# Patient Record
Sex: FEMALE | Race: WHITE | Employment: UNEMPLOYED | URBAN - METROPOLITAN AREA
[De-identification: names, ages, dates, MRNs, and addresses within clinical notes are randomized per-mention and may not be internally consistent; named-entity substitution may affect disease eponyms.]

---

## 2017-03-09 ENCOUNTER — OFFICE VISIT (OUTPATIENT)
Dept: FAMILY MEDICINE CLINIC | Age: 52
End: 2017-03-09

## 2017-03-09 VITALS
HEIGHT: 61 IN | TEMPERATURE: 97.3 F | WEIGHT: 215 LBS | OXYGEN SATURATION: 96 % | BODY MASS INDEX: 40.59 KG/M2 | SYSTOLIC BLOOD PRESSURE: 135 MMHG | HEART RATE: 76 BPM | DIASTOLIC BLOOD PRESSURE: 90 MMHG | RESPIRATION RATE: 18 BRPM

## 2017-03-09 DIAGNOSIS — E78.5 HYPERLIPIDEMIA, UNSPECIFIED HYPERLIPIDEMIA TYPE: ICD-10-CM

## 2017-03-09 DIAGNOSIS — L23.9 ALLERGIC CONTACT DERMATITIS, UNSPECIFIED TRIGGER: ICD-10-CM

## 2017-03-09 DIAGNOSIS — E11.9 TYPE 2 DIABETES MELLITUS WITHOUT COMPLICATION, WITHOUT LONG-TERM CURRENT USE OF INSULIN (HCC): Primary | ICD-10-CM

## 2017-03-09 DIAGNOSIS — I10 ESSENTIAL HYPERTENSION: ICD-10-CM

## 2017-03-09 RX ORDER — TRIAMCINOLONE ACETONIDE 1 MG/G
OINTMENT TOPICAL 2 TIMES DAILY
Qty: 30 G | Refills: 0 | Status: SHIPPED | OUTPATIENT
Start: 2017-03-09 | End: 2017-07-19

## 2017-03-09 NOTE — MR AVS SNAPSHOT
Visit Information Date & Time Provider Department Dept. Phone Encounter #  
 3/9/2017  4:15 PM Kat Lozano MD [address] [facility] [phone] [id number] Follow-up Instructions Return in about 3 months (around 6/9/2017) for dm/htn/hyperlipidemia . Your Appointments 6/9/2017  8:45 AM  
ROUTINE CARE with Kat Lozano MD  
[address] [facility] Appt Note: Return in about 3 months (around 6/9/2017) for dm/htn/hyperlipidemia [address] [facility] [address] [address]  
[phone]  
  
   
 [address] [facility] [address] Upcoming Health Maintenance Date Due  
 FOOT EXAM Q1 11/11/1976 EYE EXAM RETINAL OR DILATED Q1 11/11/1976 DTaP/Tdap/Td series (1 - Tdap) 11/11/1987 PAP AKA CERVICAL CYTOLOGY 11/11/1987 INFLUENZA AGE 9 TO ADULT 8/1/2016 MICROALBUMIN Q1 11/11/2016 FOBT Q 1 YEAR AGE 50-75 11/11/2016 HEMOGLOBIN A1C Q6M 12/2/2016 LIPID PANEL Q1 6/2/2017 BREAST CANCER SCRN MAMMOGRAM 5/9/2018 Allergies as of 3/9/2017  Review Complete On: 3/9/2017 By: Jin Barrow LPN Severity Noted Reaction Type Reactions Demerol [Meperidine] High 11/11/2015    Other (comments) Passed out and started fighting Penicillins High 11/11/2015    Hives Current Immunizations  Reviewed on 11/16/2015 Name Date Influenza Vaccine 11/16/2015 Pneumococcal Polysaccharide (PPSV-23) 11/16/2015 Not reviewed this visit You Were Diagnosed With   
  
 Codes Comments Type 2 diabetes mellitus without complication, without long-term current use of insulin (HCC)    -  Primary ICD-10-CM: E11.9 ICD-9-CM: 250.00 Hyperlipidemia, unspecified hyperlipidemia type     ICD-10-CM: E78.5 ICD-9-CM: 272.4 Essential hypertension     ICD-10-CM: I10 
ICD-9-CM: 401.9 Allergic contact dermatitis, unspecified trigger     ICD-10-CM: L23.9 ICD-9-CM: 692.9 Vitals BP Pulse Temp Resp Height(growth percentile) Weight(growth percentile) 135/90 76 97.3 °F (36.3 °C) 18 5' 1\" (1.549 m) 215 lb (97.5 kg) SpO2 BMI OB Status Smoking Status 96% 40.62 kg/m2 Premenopausal Never Smoker Vitals History BMI and BSA Data Body Mass Index Body Surface Area  
 40.62 kg/m 2 2.05 m 2 Preferred Pharmacy Pharmacy Name Phone [id number], [address] [phone] Your Updated Medication List  
  
   
This list is accurate as of: 3/9/17  4:41 PM.  Always use your most recent med list.  
  
  
  
  
 famotidine 10 mg tablet Commonly known as:  PEPCID Take 10 mg by mouth three (3) times daily. fluticasone 50 mcg/actuation nasal spray Commonly known as:  Beemer Finely Two sprays to each nostril twice a day for one week HYDROcodone-acetaminophen 5-325 mg per tablet Commonly known as:  Wayna Glaze Take 2 Tabs by mouth every four (4) hours as needed for Pain. Max Daily Amount: 12 Tabs. Indications: PAIN  
  
 KRILL OIL PO Take  by mouth.  
  
 lisinopril-hydroCHLOROthiazide 20-12.5 mg per tablet Commonly known as:  Katlucianaene Rockers Take  by mouth daily. loratadine 10 mg dissolvable tablet Commonly known as:  Jitendra  Take 10 mg by mouth daily. metFORMIN 500 mg tablet Commonly known as:  GLUCOPHAGE Take 1 Tab by mouth daily (with breakfast). multivitamin tablet Commonly known as:  ONE A DAY Take 1 Tab by mouth daily. omega-3 fatty acids Cap Take 1,200 mg by mouth.  
  
 triamcinolone acetonide 0.1 % ointment Commonly known as:  KENALOG Apply  to affected area two (2) times a day. use thin layer Prescriptions Sent to Pharmacy Refills  
 triamcinolone acetonide (KENALOG) 0.1 % ointment 0 Sig: Apply  to affected area two (2) times a day. use thin layer  Class: Normal  
 [facility] [address] [place] [address], [address] [place] [address] #: [phone] Route: Topical  
  
We Performed the Following REFERRAL TO OPHTHALMOLOGY [REF57 Custom] Comments:  
 Please evaluate patient for diabetic eye exam.  
  
Follow-up Instructions Return in about 3 months (around 6/9/2017) for dm/htn/hyperlipidemia . To-Do List   
 03/09/2017 Lab:  CBC WITH AUTOMATED DIFF   
  
 03/09/2017 Lab:  HEMOGLOBIN A1C WITH EAG   
  
 03/09/2017 Lab:  LIPID PANEL   
  
 03/09/2017 Lab:  METABOLIC PANEL, COMPREHENSIVE   
  
 03/09/2017 Lab:  MICROALBUMIN, UR, RAND W/ MICROALBUMIN/CREA RATIO Referral Information Referral ID Referred By Referred To  
  
 [id number] Sheldon HUERTA Not Available Visits Status Start Date End Date 1 New Request 3/9/17 3/9/18 If your referral has a status of pending review or denied, additional information will be sent to support the outcome of this decision. Introducing [facility]! Dear Damion Glass: Thank you for requesting a [employer] account. Our records indicate that you already have an active [employer] account. You can access your account anytime at [email or web address]. [email or web address] Did you know that you can access your hospital and ER discharge instructions at any time in [employer]? You can also review all of your test results from your hospital stay or ER visit. Additional Information If you have questions, please visit the Frequently Asked Questions section of the [employer] website at [email or web address]/. Remember, [employer] is NOT to be used for urgent needs. For medical emergencies, dial 911. Now available from your iPhone and Android! Please provide this summary of care documentation to your next provider. Your primary care clinician is listed as Sandee Angela. If you have any questions after today's visit, please call [phone].

## 2017-03-09 NOTE — PROGRESS NOTES
HISTORY OF PRESENT ILLNESS  Trinity Cowart is a 49 y.o. female. HPI Comments: Patient is here for  Her 3 month follow. She has been taking her medications and she is due to see a eye doctor for an eye exam.  She has been taking her medications and she mentions that she has better eating in [place] than when she is here. She is due for some blood work. Hypertension    The history is provided by the patient. This is a chronic problem. The problem has been gradually improving. Pertinent negatives include no chest pain, no orthopnea, no palpitations, no anxiety, no confusion, no malaise/fatigue, no blurred vision, no headaches, no tinnitus, no neck pain, no peripheral edema, no dizziness, no shortness of breath, no nausea and no vomiting. Risk factors include hypertension, stress, obesity, dyslipidemia and diabetes mellitus. Diabetes   The history is provided by the patient. This is a chronic problem. The problem occurs constantly. The problem has been gradually improving. Pertinent negatives include no chest pain, no abdominal pain, no headaches and no shortness of breath. The symptoms are aggravated by stress and eating. The symptoms are relieved by medications. Treatments tried: on medications. The treatment provided mild relief. Cholesterol Problem   The history is provided by the patient. This is a chronic problem. The problem occurs constantly. The problem has been gradually worsening. Pertinent negatives include no chest pain, no abdominal pain, no headaches and no shortness of breath. The symptoms are aggravated by stress and eating. The symptoms are relieved by medications. Treatments tried: medications. The treatment provided mild relief. Rash    The history is provided by the patient. This is a new problem. The problem has been gradually worsening. The problem is associated with chemical exposure and plant contact. There has been no fever. Affected Location: left arm.  The pain is at a severity of 4/10. The pain is moderate. The pain has been constant since onset. Pertinent negatives include no blisters and no pain. She has tried nothing for the symptoms. Review of Systems   Constitutional: Negative for chills, diaphoresis, fever and malaise/fatigue. HENT: Negative for congestion, ear pain, sore throat and tinnitus. Eyes: Negative for blurred vision, double vision and pain. Respiratory: Negative for cough, sputum production, shortness of breath and wheezing. Cardiovascular: Negative for chest pain, palpitations, orthopnea and leg swelling. Gastrointestinal: Negative for abdominal pain, diarrhea, nausea and vomiting. Genitourinary: Negative for dysuria, hematuria and urgency. Musculoskeletal: Negative for back pain, joint pain and neck pain. Skin: Positive for rash. Neurological: Negative for dizziness, tingling, tremors, focal weakness, weakness and headaches. Psychiatric/Behavioral: Negative for confusion and depression. The patient is not nervous/anxious and does not have insomnia. Visit Vitals    /90    Pulse 76    Temp 97.3 °F (36.3 °C)    Resp 18    Ht 5' 1\" (1.549 m)    Wt 215 lb (97.5 kg)    SpO2 96%    BMI 40.62 kg/m2       Physical Exam   Constitutional: She is oriented to person, place, and time. She appears well-developed and well-nourished. No distress. HENT:   Head: Normocephalic and atraumatic. Right Ear: External ear normal.   Left Ear: External ear normal.   Mouth/Throat: Oropharynx is clear and moist.   Eyes: EOM are normal. Pupils are equal, round, and reactive to light. No scleral icterus. Neck: Normal range of motion. No thyromegaly present. Cardiovascular: Normal rate, regular rhythm and normal heart sounds. Pulmonary/Chest: Effort normal and breath sounds normal. No respiratory distress. She has no wheezes. Abdominal: Soft. Bowel sounds are normal. She exhibits no distension. There is no tenderness.    Lymphadenopathy:     She has no cervical adenopathy. Neurological: She is alert and oriented to person, place, and time. Psychiatric: She has a normal mood and affect. ASSESSMENT and PLAN  Hypertension :  1) Goal blood pressure less than equal to 140/90 mmHg, goal BP can vary depending on risk factors as discussed. 2) Lifestyle modifications discussed with patient, low sodium <2 gm, low salt , DASH diet  3) Exercise for at least 30 min 3-5 times a week for goal BMI of less than or equal  To 25.  4) Continue current medications as prescribed. 5) Please begin medication as discussed for better blood pressure control, explained side effects and patient verbalized understanding. 6) Goal LDL<100.  7) Please monitor your blood pressure and keep a log to bring in with you at each visit. 8) Discussed risk factors with patient such as CAD, FAST of stroke symptoms, pt verbalizes understanding. 9) Please avoid smoking , alcohol and any illicit drug use if applicable to you. Diabetes :  1)   Goal HBA1C < 7.  2)   Post prandial blood sugar less than or equal to 180.  3)   Exercise 30 min 3-5 times a week for goal BMI of 25.  4)   Please monitor blood sugars as directed and keep a log to bring in with you at each visit. 5)   Diabetic diet discussed and patient instructions to be handed out. 6)   Goal LDL< 100  7)   Goal BP< 120/80 mmhg. 8)   Annual eye exam and foot exam.  9)   Please examine you feet daily for any skin breakages or ulcers. 10) Referral made to see nutritionist for better dietary guidance. 11) Continue current medications as prescribed. 12) Explained the side effects of medication and patient verbalized understanding. 13) Please avoid smoking , alcohol and illicit drug use if applicable to you.  14) Please have blood work ordered today completed. 15) Please make sure you schedule your routine medical exam every 1-2 years.     Hyperlipidemia :  1) Goal LDL less than or equal to 100 mg/dl , total cholesterol less than or equal to 200 mg/dl. 2) Goal blood pressure less than or equal to 140/90 mmHg. 3) Discussed low - cholesterol and low fat diet, Good Fats vs Bad Fats. 4) Exercise 30 min 3-5 times a week for goal BMI of less than or equal to 25.  5) Continue current medication as prescribed for cholesterol control. 6) Explained the side effects of medication and patient verbalized understanding. 7) You may also begin fish oil 1000 mg 3-4 times a day for better cholesterol control. 8) You may also try red yeast rice for cholesterol control. 9) Please drink skim milk if you drink dairy products. 10) Please avoid smoking , alcohol , or any illicit drug use if applicable to you. 11) Regular cholesterol panel to be done every 6-12 months.  12) Discussed attributing risk factors , patient verbalized understanding. Contact dermatitis :   - Wash your skin  with lots of water to remove any traces of the irritant that may remain on the skin.    -You should avoid further exposure to known irritants or allergens.   -Emollients or moisturizers to keep the skin moist, and also help skin repair itself.s.   - Begin to use Corticosteroid skin creams or ointments may reduce inflammation, do not over use topical take a break after five continuous days of use. - Alternative to steroids can be tacrolimus cream  -In severe cases, corticosteroid pills may be needed. You will start them on a high dose, which is tapered gradually over about 12 days.   -Wet dressings and soothing anti-itch (antipruritic) or drying lotions may be recommended to reduce other symptoms.

## 2017-03-09 NOTE — PROGRESS NOTES
Chief Complaint   Patient presents with    Hypertension    Diabetes    Cholesterol Problem     1. Have you been to the ER, urgent care clinic since your last visit? Hospitalized since your last visit? No    2. Have you seen or consulted any other health care providers outside of the [address] [place] [address] since your last visit? Include any pap smears or colon screening.  No

## 2017-04-02 RX ORDER — METFORMIN HYDROCHLORIDE 500 MG/1
TABLET ORAL
Qty: 90 TAB | Refills: 3 | Status: SHIPPED | OUTPATIENT
Start: 2017-04-02 | End: 2017-09-22 | Stop reason: SDUPTHER

## 2017-05-08 RX ORDER — LISINOPRIL AND HYDROCHLOROTHIAZIDE 12.5; 2 MG/1; MG/1
TABLET ORAL
Qty: 90 TAB | Refills: 3 | Status: SHIPPED | OUTPATIENT
Start: 2017-05-08 | End: 2017-08-03 | Stop reason: SDUPTHER

## 2017-07-19 ENCOUNTER — OFFICE VISIT (OUTPATIENT)
Dept: FAMILY MEDICINE CLINIC | Age: 52
End: 2017-07-19

## 2017-07-19 VITALS
OXYGEN SATURATION: 97 % | SYSTOLIC BLOOD PRESSURE: 141 MMHG | HEIGHT: 64 IN | BODY MASS INDEX: 36.02 KG/M2 | WEIGHT: 211 LBS | DIASTOLIC BLOOD PRESSURE: 76 MMHG | HEART RATE: 88 BPM | RESPIRATION RATE: 16 BRPM | TEMPERATURE: 96.8 F

## 2017-07-19 DIAGNOSIS — L03.211 CELLULITIS, FACE: Primary | ICD-10-CM

## 2017-07-19 RX ORDER — CLINDAMYCIN HYDROCHLORIDE 300 MG/1
300 CAPSULE ORAL 3 TIMES DAILY
Qty: 21 CAP | Refills: 0 | Status: SHIPPED | OUTPATIENT
Start: 2017-07-19 | End: 2017-07-26

## 2017-07-19 RX ORDER — SAME BUTANEDISULFONATE/BETAINE 400-600 MG
250 POWDER IN PACKET (EA) ORAL 2 TIMES DAILY
Qty: 14 CAP | Refills: 0 | Status: SHIPPED | OUTPATIENT
Start: 2017-07-19 | End: 2017-07-26

## 2017-07-19 NOTE — PROGRESS NOTES
Chief Complaint   Patient presents with    Eye Swelling     5 days. Vision is blurry in left eye. Swollen with clear drainage. 1. Have you been to the ER, urgent care clinic since your last visit? Hospitalized since your last visit? No    2. Have you seen or consulted any other health care providers outside of the [address] [place] [address] since your last visit? Include any pap smears or colon screening.  No

## 2017-07-19 NOTE — PATIENT INSTRUCTIONS
Take clindamycin as prescribed  Take probiotic with this medicine to decrease chance of diarrhea    Go to hospital if worsening swelling, EYE PAIN WITH MOVEMENT or DOUBLE VISION as this can be a worsening infection      Cellulitis of the Eye: Care Instructions  Your Care Instructions    Cellulitis of the eye is an infection of the skin and tissues around the eye. It is also called periorbital cellulitis. It is usually caused by bacteria. This type of infection may happen after a sinus infection or a dental infection. It could also happen after an insect bite or an injury to the face. It most often occurs where there is a break in the skin. Cellulitis of the eye can be very serious. It's important to treat it right away. If you do, it usually goes away without lasting problems. Medicine and home treatment can help you get better. Follow-up care is a key part of your treatment and safety. Be sure to make and go to all appointments, and call your doctor if you are having problems. It's also a good idea to know your test results and keep a list of the medicines you take. How can you care for yourself at home? · Take your antibiotics as directed. Do not stop taking them just because you feel better. You need to take the full course of antibiotics. · Do not wear contact lenses unless your doctor tells you it is okay. · Put your head on pillows, and put a cool, damp cloth on your eye. This can reduce swelling and pain. · If your doctor recommends it, use a warm pack on your eye. · Keep the skin around your eye clean and dry. · Be safe with medicines. Read and follow all instructions on the label. ¨ If the doctor gave you a prescription medicine for pain, take it as prescribed. ¨ If you are not taking a prescription pain medicine, ask your doctor if you can take an over-the-counter medicine. To prevent cellulitis  · Wash your hands well after you use the bathroom and before and after you eat.   · Do not rub or pick at the skin around your eyes. Cellulitis occurs most often where there is a break in the skin. · If you get a cut, pimple, or insect bite near your eye, clean the area as soon as you can. This can help prevent an infection. ¨ Wash the area with cool water and a mild soap, such as [place]. ¨ Do not use rubbing alcohol, hydrogen peroxide, iodine, or Mercurochrome. These can harm the tissues and slow healing. · Call your doctor if you have a sinus infection with redness or swelling of your eyes. When should you call for help? Call your doctor now or seek immediate medical care if:  · You have new or worse signs of an eye infection, such as:  ¨ Pus or thick discharge coming from the eye. ¨ Redness or swelling around the eye. ¨ A fever. · Your eye seems to be bulging out. · You seem to be getting sicker. · You have vision changes. Watch closely for changes in your health, and be sure to contact your doctor if:  · You do not get better as expected. Where can you learn more? Go to http://gaston-ivone.info/. Enter [phone] in the search box to learn more about \"Cellulitis of the Eye: Care Instructions. \"  Current as of: March 14, 2017  Content Version: 11.3  © 7346-1128 [employer]. Care instructions adapted under license by [employer] (which disclaims liability or warranty for this information). If you have questions about a medical condition or this instruction, always ask your healthcare professional. [place] [address] any warranty or liability for your use of this information.

## 2017-07-19 NOTE — PROGRESS NOTES
[facility] INTERNAL MEDICINE NOTE    Chief Complaint   Patient presents with    Eye Swelling       HPI: Jeremy Caceres is a 49 y.o. female who presents with the above CC(s). Left Eye swelling for 5 days upon waking, blurry vision, redness and warmth. No double vision, vision loss or pain with eye movements. Benadryl not helpful. No fevers or chills. ocured about 1 month resolved without treatment after 3 days, took benadryl and was helpful. No redness of eye or eye discharge. Some sinus congestion. No sore throat, runny nose, ear pain, or trouble swallowing. No nausea or vomiting. Past Medical Hx, Family Hx, Social Hx, Surgical Hx, Medications, Allergies: All reviewed and updated in EMR as appropriate. Current Outpatient Prescriptions   Medication Sig    clindamycin (CLEOCIN) 300 mg capsule Take 1 Cap by mouth three (3) times daily for 7 days.  Saccharomyces boulardii (PROBIOTIC, S.BOULARDII,) 250 mg capsule Take 1 Cap by mouth two (2) times a day for 7 days.  lisinopril-hydroCHLOROthiazide (PRINZIDE, ZESTORETIC) 20-12.5 mg per tablet take 1 tablet by mouth daily    metFORMIN (GLUCOPHAGE) 500 mg tablet take 1 tablet by mouth once daily WITH BREAKFAST    omega-3 fatty acids cap Take 1,200 mg by mouth.  multivitamin (ONE A DAY) tablet Take 1 Tab by mouth daily.  famotidine (PEPCID) 10 mg tablet Take 10 mg by mouth three (3) times daily.  fluticasone (FLONASE) 50 mcg/actuation nasal spray Two sprays to each nostril twice a day for one week    HYDROcodone-acetaminophen (NORCO) 5-325 mg per tablet Take 2 Tabs by mouth every four (4) hours as needed for Pain. Max Daily Amount: 12 Tabs. Indications: PAIN     No current facility-administered medications for this visit.       Health Maintenance   Topic Date Due    FOOT EXAM Q1  11/11/1976    EYE EXAM RETINAL OR DILATED Q1  11/11/1976    DTaP/Tdap/Td series (1 - Tdap) 11/11/1987    PAP AKA CERVICAL CYTOLOGY  11/11/1987    MICROALBUMIN Q1  11/11/2016    FOBT Q 1 YEAR AGE 50-75  11/11/2016    HEMOGLOBIN A1C Q6M  12/02/2016    LIPID PANEL Q1  06/02/2017    INFLUENZA AGE 9 TO ADULT  08/01/2017    BREAST CANCER SCRN MAMMOGRAM  05/09/2018    Pneumococcal 19-64 Medium Risk  Completed       OBJECTIVE:  Vitals:    07/19/17 1333   BP: 141/76   Pulse: 88   Resp: 16   Temp: 96.8 °F (36 °C)   TempSrc: Oral   SpO2: 97%   Weight: 211 lb (95.7 kg)   Height: 5' 4\" (1.626 m)   General: Pleasant middle aged woman in no acute distress  Left Eye: Erythema and swelling eye lid left with warmth and tenderness. EOMI. No conjunctival injection or discharge. PERRLA. HENT: Ears, nose, oropharynx wnl. Neck: Negative cervical LAD        Nursing Notes Reviewed    ASSESSMENT AND PLAN:    ICD-10-CM ICD-9-CM    1. Cellulitis, face L03.211 682.0 clindamycin (CLEOCIN) 300 mg capsule      Saccharomyces boulardii (PROBIOTIC, S.BOULARDII,) 250 mg capsule  Given report to ED precautions  RTC PRN       Orders Placed This Encounter    clindamycin (CLEOCIN) 300 mg capsule    Saccharomyces boulardii (PROBIOTIC, S.BOULARDII,) 250 mg capsule       Future Appointments  Date Time Provider Ria Larose   8/3/2017 8:30 AM Sandee Adams MD [address] [place] [address]       After visit summary provided to patient    Assessment and plan above discussed with patient, patient voiced understanding and agreement with plan. Timmy Dubon M.D.   [place] [phone]  [place] [phone]

## 2017-07-19 NOTE — MR AVS SNAPSHOT
Visit Information Date & Time Provider Department Dept. Phone Encounter #  
 7/19/2017  1:30 PM Burgess Rocio MD [address] [facility] [phone] [id number] Follow-up Instructions Return if symptoms worsen or fail to improve. Your Appointments 8/3/2017  8:30 AM  
COMPLETE PHYSICAL with Tammy Thompson MD  
[address] [facility]) Appt Note: CPE per pt  
 [address] [facility] [address] [address]  
[phone]  
  
   
 [address] [facility] [address] Upcoming Health Maintenance Date Due  
 FOOT EXAM Q1 11/11/1976 EYE EXAM RETINAL OR DILATED Q1 11/11/1976 DTaP/Tdap/Td series (1 - Tdap) 11/11/1987 PAP AKA CERVICAL CYTOLOGY 11/11/1987 MICROALBUMIN Q1 11/11/2016 FOBT Q 1 YEAR AGE 50-75 11/11/2016 HEMOGLOBIN A1C Q6M 12/2/2016 LIPID PANEL Q1 6/2/2017 INFLUENZA AGE 9 TO ADULT 8/1/2017 BREAST CANCER SCRN MAMMOGRAM 5/9/2018 Allergies as of 7/19/2017  Review Complete On: 7/19/2017 By: Burgess Rocio MD  
  
 Severity Noted Reaction Type Reactions Demerol [Meperidine] High 11/11/2015    Other (comments) Passed out and started fighting Penicillins High 11/11/2015    Hives Current Immunizations  Reviewed on 11/16/2015 Name Date Influenza Vaccine 11/16/2015 Pneumococcal Polysaccharide (PPSV-23) 11/16/2015 Not reviewed this visit You Were Diagnosed With   
  
 Codes Comments Cellulitis, face    -  Primary ICD-10-CM: X73.474 ICD-9-CM: 975. 0 Vitals BP Pulse Temp Resp Height(growth percentile) Weight(growth percentile) 141/76 88 96.8 °F (36 °C) (Oral) 16 5' 4\" (1.626 m) 211 lb (95.7 kg) SpO2 BMI OB Status Smoking Status 97% 36.22 kg/m2 Premenopausal Never Smoker Vitals History BMI and BSA Data Body Mass Index Body Surface Area  
 36.22 kg/m 2 2.08 m 2 Preferred Pharmacy Pharmacy Name Phone [id number]  [place] [facility], [address] [place] [address] [phone] Your Updated Medication List  
  
   
This list is accurate as of: 7/19/17  2:00 PM.  Always use your most recent med list.  
  
  
  
  
 clindamycin 300 mg capsule Commonly known as:  CLEOCIN Take 1 Cap by mouth three (3) times daily for 7 days. famotidine 10 mg tablet Commonly known as:  PEPCID Take 10 mg by mouth three (3) times daily. fluticasone 50 mcg/actuation nasal spray Commonly known as:  Octavia Hoot Two sprays to each nostril twice a day for one week HYDROcodone-acetaminophen 5-325 mg per tablet Commonly known as:  Gleda Ridgel Take 2 Tabs by mouth every four (4) hours as needed for Pain. Max Daily Amount: 12 Tabs. Indications: PAIN  
  
 lisinopril-hydroCHLOROthiazide 20-12.5 mg per tablet Commonly known as:  PRINZIDE, ZESTORETIC  
take 1 tablet by mouth daily  
  
 metFORMIN 500 mg tablet Commonly known as:  GLUCOPHAGE  
take 1 tablet by mouth once daily WITH BREAKFAST  
  
 multivitamin tablet Commonly known as:  ONE A DAY Take 1 Tab by mouth daily. omega-3 fatty acids Cap Take 1,200 mg by mouth. Saccharomyces boulardii 250 mg capsule Commonly known as:  PROBIOTIC (S.BOULARDII) Take 1 Cap by mouth two (2) times a day for 7 days. Prescriptions Sent to Pharmacy Refills  
 clindamycin (CLEOCIN) 300 mg capsule 0 Sig: Take 1 Cap by mouth three (3) times daily for 7 days. Class: Normal  
 Pharmacy: [address] Ph #: [phone] Route: Oral  
 Saccharomyces boulardii (PROBIOTIC, S.BOULARDII,) 250 mg capsule 0 Sig: Take 1 Cap by mouth two (2) times a day for 7 days. Class: Normal  
 Pharmacy: [address] Ph #: [phone] Route: Oral  
  
Follow-up Instructions Return if symptoms worsen or fail to improve. Patient Instructions Take clindamycin as prescribed Take probiotic with this medicine to decrease chance of diarrhea Go to hospital if worsening swelling, EYE PAIN WITH MOVEMENT or DOUBLE VISION as this can be a worsening infection Cellulitis of the Eye: Care Instructions Your Care Instructions Cellulitis of the eye is an infection of the skin and tissues around the eye. It is also called periorbital cellulitis. It is usually caused by bacteria. This type of infection may happen after a sinus infection or a dental infection. It could also happen after an insect bite or an injury to the face. It most often occurs where there is a break in the skin. Cellulitis of the eye can be very serious. It's important to treat it right away. If you do, it usually goes away without lasting problems. Medicine and home treatment can help you get better. Follow-up care is a key part of your treatment and safety. Be sure to make and go to all appointments, and call your doctor if you are having problems. It's also a good idea to know your test results and keep a list of the medicines you take. How can you care for yourself at home? · Take your antibiotics as directed. Do not stop taking them just because you feel better. You need to take the full course of antibiotics. · Do not wear contact lenses unless your doctor tells you it is okay. · Put your head on pillows, and put a cool, damp cloth on your eye. This can reduce swelling and pain. · If your doctor recommends it, use a warm pack on your eye. · Keep the skin around your eye clean and dry. · Be safe with medicines. Read and follow all instructions on the label. ¨ If the doctor gave you a prescription medicine for pain, take it as prescribed. ¨ If you are not taking a prescription pain medicine, ask your doctor if you can take an over-the-counter medicine. To prevent cellulitis · Wash your hands well after you use the bathroom and before and after you eat. · Do not rub or pick at the skin around your eyes. Cellulitis occurs most often where there is a break in the skin. · If you get a cut, pimple, or insect bite near your eye, clean the area as soon as you can. This can help prevent an infection. ¨ Wash the area with cool water and a mild soap, such as [place]. ¨ Do not use rubbing alcohol, hydrogen peroxide, iodine, or Mercurochrome. These can harm the tissues and slow healing. · Call your doctor if you have a sinus infection with redness or swelling of your eyes. When should you call for help? Call your doctor now or seek immediate medical care if: 
· You have new or worse signs of an eye infection, such as: 
¨ Pus or thick discharge coming from the eye. ¨ Redness or swelling around the eye. ¨ A fever. · Your eye seems to be bulging out. · You seem to be getting sicker. · You have vision changes. Watch closely for changes in your health, and be sure to contact your doctor if: 
· You do not get better as expected. Where can you learn more? Go to http://gaston-ivone.info/. Enter [phone] in the search box to learn more about \"Cellulitis of the Eye: Care Instructions. \" Current as of: March 14, 2017 Content Version: 11.3 © 4113-9683 [employer] Rocket. Care instructions adapted under license by [employer] (which disclaims liability or warranty for this information). If you have questions about a medical condition or this instruction, always ask your healthcare professional. [place] [address] any warranty or liability for your use of this information. Introducing [facility]! Dear Marilee Morfin: Thank you for requesting a Vibrynt account. Our records indicate that you already have an active Vibrynt account. You can access your account anytime at [email or web address] Did you know that you can access your hospital and ER discharge instructions at any time in JoySports? You can also review all of your test results from your hospital stay or ER visit. Additional Information If you have questions, please visit the Frequently Asked Questions section of the JoySports website at [email or web address] Remember, JoySports is NOT to be used for urgent needs. For medical emergencies, dial 911. Now available from your iPhone and Android! Please provide this summary of care documentation to your next provider. Your primary care clinician is listed as [facility]. If you have any questions after today's visit, please call [phone].

## 2017-08-03 ENCOUNTER — HOSPITAL ENCOUNTER (OUTPATIENT)
Dept: LAB | Age: 52
Discharge: HOME | End: 2017-08-03
Payer: COMMERCIAL

## 2017-08-03 ENCOUNTER — OFFICE VISIT (OUTPATIENT)
Dept: FAMILY MEDICINE CLINIC | Age: 52
End: 2017-08-03

## 2017-08-03 VITALS
HEART RATE: 75 BPM | RESPIRATION RATE: 16 BRPM | DIASTOLIC BLOOD PRESSURE: 77 MMHG | SYSTOLIC BLOOD PRESSURE: 119 MMHG | HEIGHT: 64 IN | WEIGHT: 212 LBS | TEMPERATURE: 96.8 F | OXYGEN SATURATION: 100 % | BODY MASS INDEX: 36.19 KG/M2

## 2017-08-03 DIAGNOSIS — E11.9 TYPE 2 DIABETES MELLITUS WITHOUT COMPLICATION, WITHOUT LONG-TERM CURRENT USE OF INSULIN (HCC): ICD-10-CM

## 2017-08-03 DIAGNOSIS — E78.5 HYPERLIPIDEMIA, UNSPECIFIED HYPERLIPIDEMIA TYPE: ICD-10-CM

## 2017-08-03 DIAGNOSIS — Z12.4 PAP SMEAR FOR CERVICAL CANCER SCREENING: ICD-10-CM

## 2017-08-03 DIAGNOSIS — Z12.11 SCREENING FOR COLON CANCER: ICD-10-CM

## 2017-08-03 DIAGNOSIS — Z00.00 PHYSICAL EXAM: Primary | ICD-10-CM

## 2017-08-03 DIAGNOSIS — I10 ESSENTIAL HYPERTENSION: ICD-10-CM

## 2017-08-03 DIAGNOSIS — Z76.0 MEDICATION REFILL: ICD-10-CM

## 2017-08-03 DIAGNOSIS — Z80.0 FAMILY HISTORY OF COLON CANCER: ICD-10-CM

## 2017-08-03 LAB
ALBUMIN SERPL BCP-MCNC: 3.9 G/DL (ref 3.4–5)
ALBUMIN/GLOB SERPL: 1.1 {RATIO} (ref 0.8–1.7)
ALP SERPL-CCNC: 112 U/L (ref 45–117)
ALT SERPL-CCNC: 75 U/L (ref 13–56)
ANION GAP BLD CALC-SCNC: 10 MMOL/L (ref 3–18)
AST SERPL W P-5'-P-CCNC: 32 U/L (ref 15–37)
BASOPHILS # BLD AUTO: 0 K/UL (ref 0–0.06)
BASOPHILS # BLD: 1 % (ref 0–2)
BILIRUB SERPL-MCNC: 0.5 MG/DL (ref 0.2–1)
BUN SERPL-MCNC: 11 MG/DL (ref 7–18)
BUN/CREAT SERPL: 19 (ref 12–20)
CALCIUM SERPL-MCNC: 9.6 MG/DL (ref 8.5–10.1)
CHLORIDE SERPL-SCNC: 100 MMOL/L (ref 100–108)
CHOLEST SERPL-MCNC: 278 MG/DL
CO2 SERPL-SCNC: 27 MMOL/L (ref 21–32)
CREAT SERPL-MCNC: 0.57 MG/DL (ref 0.6–1.3)
DIFFERENTIAL METHOD BLD: ABNORMAL
EOSINOPHIL # BLD: 0.4 K/UL (ref 0–0.4)
EOSINOPHIL NFR BLD: 4 % (ref 0–5)
ERYTHROCYTE [DISTWIDTH] IN BLOOD BY AUTOMATED COUNT: 14 % (ref 11.6–14.5)
EST. AVERAGE GLUCOSE BLD GHB EST-MCNC: 200 MG/DL
GLOBULIN SER CALC-MCNC: 3.6 G/DL (ref 2–4)
GLUCOSE SERPL-MCNC: 180 MG/DL (ref 74–99)
HBA1C MFR BLD: 8.6 % (ref 4.2–5.6)
HCT VFR BLD AUTO: 49.4 % (ref 35–45)
HDLC SERPL-MCNC: 37 MG/DL (ref 40–60)
HDLC SERPL: 7.5 {RATIO} (ref 0–5)
HGB BLD-MCNC: 16 G/DL (ref 12–16)
LDLC SERPL CALC-MCNC: 186.4 MG/DL (ref 0–100)
LIPID PROFILE,FLP: ABNORMAL
LYMPHOCYTES # BLD AUTO: 44 % (ref 21–52)
LYMPHOCYTES # BLD: 3.8 K/UL (ref 0.9–3.6)
MCH RBC QN AUTO: 30.1 PG (ref 24–34)
MCHC RBC AUTO-ENTMCNC: 32.4 G/DL (ref 31–37)
MCV RBC AUTO: 93 FL (ref 74–97)
MONOCYTES # BLD: 0.4 K/UL (ref 0.05–1.2)
MONOCYTES NFR BLD AUTO: 5 % (ref 3–10)
NEUTS SEG # BLD: 4 K/UL (ref 1.8–8)
NEUTS SEG NFR BLD AUTO: 46 % (ref 40–73)
PLATELET # BLD AUTO: 202 K/UL (ref 135–420)
PMV BLD AUTO: 13.5 FL (ref 9.2–11.8)
POTASSIUM SERPL-SCNC: 4.1 MMOL/L (ref 3.5–5.5)
PROT SERPL-MCNC: 7.5 G/DL (ref 6.4–8.2)
RBC # BLD AUTO: 5.31 M/UL (ref 4.2–5.3)
SODIUM SERPL-SCNC: 137 MMOL/L (ref 136–145)
TRIGL SERPL-MCNC: 273 MG/DL (ref ?–150)
TSH SERPL DL<=0.05 MIU/L-ACNC: 2.89 UIU/ML (ref 0.36–3.74)
VLDLC SERPL CALC-MCNC: 54.6 MG/DL
WBC # BLD AUTO: 8.6 K/UL (ref 4.6–13.2)

## 2017-08-03 PROCEDURE — 85025 COMPLETE CBC W/AUTO DIFF WBC: CPT | Performed by: FAMILY MEDICINE

## 2017-08-03 PROCEDURE — 82043 UR ALBUMIN QUANTITATIVE: CPT | Performed by: FAMILY MEDICINE

## 2017-08-03 PROCEDURE — 80061 LIPID PANEL: CPT | Performed by: FAMILY MEDICINE

## 2017-08-03 PROCEDURE — 83036 HEMOGLOBIN GLYCOSYLATED A1C: CPT | Performed by: FAMILY MEDICINE

## 2017-08-03 PROCEDURE — 84443 ASSAY THYROID STIM HORMONE: CPT | Performed by: FAMILY MEDICINE

## 2017-08-03 PROCEDURE — 87798 DETECT AGENT NOS DNA AMP: CPT | Performed by: FAMILY MEDICINE

## 2017-08-03 PROCEDURE — 80053 COMPREHEN METABOLIC PANEL: CPT | Performed by: FAMILY MEDICINE

## 2017-08-03 RX ORDER — LISINOPRIL AND HYDROCHLOROTHIAZIDE 12.5; 2 MG/1; MG/1
TABLET ORAL
Qty: 90 TAB | Refills: 3 | Status: SHIPPED | OUTPATIENT
Start: 2017-08-03 | End: 2018-09-04 | Stop reason: SDUPTHER

## 2017-08-03 NOTE — PROGRESS NOTES
Patient is here for dm check. ..1. Have you been to the ER, urgent care clinic since your last visit? Hospitalized since your last visit?no    2. Have you seen or consulted any other health care providers outside of the [address] [place] [address] since your last visit? Include any pap smears or colon screening.  no

## 2017-08-03 NOTE — PROGRESS NOTES
HISTORY OF PRESENT ILLNESS  David Bedolla is a 49 y.o. female. HPI Comments: Patient is here for a physical exam . Patient is due to see an eye doctor. Patient has not seen a dentist. Patient is due for blood work today. She is also due for a PAP smear and has not had one in a few years. She does continue to have a cycle. I will also order a referral for colorectal screening as patient mentions her father passed away from colon cancer. I will place a referral today. I have also given her the previous referral for her eye doctor and she will make an appointment. She is also due for blood work as ordered as part of her diabetes / hypertension. She mentions she has noticed her blood sugars have been up lately as she has had a few stressful months and was not taking her medications regularly. Complete Physical   The history is provided by the patient. This is a new problem. The problem occurs constantly. The problem has not changed since onset. Associated symptoms include headaches. Pertinent negatives include no chest pain, no abdominal pain and no shortness of breath. Nothing aggravates the symptoms. Nothing relieves the symptoms. She has tried nothing for the symptoms.      Patient Active Problem List   Diagnosis Code    Type 2 diabetes mellitus without complication ([facility] [address] J80.6    Essential hypertension I10    Right middle lobe pneumonia ([facility] [address] J18.1    Hyperlipidemia E78.5    Bilateral fibrocystic breast disease (FCBD) N60.12, N60.11    Intraductal papilloma of breast D24.9    Breast discharge N64.52    Bleeding from nipple in female N57.56    FH: colon cancer in relative diagnosed at >47 years old [de-identified]    Screening for colon cancer Z12.11     Current Outpatient Prescriptions on File Prior to Visit   Medication Sig Dispense Refill    metFORMIN (GLUCOPHAGE) 500 mg tablet take 1 tablet by mouth once daily WITH BREAKFAST 90 Tab 3    HYDROcodone-acetaminophen (NORCO) 5-325 mg per tablet Take 2 Tabs by mouth every four (4) hours as needed for Pain. Max Daily Amount: 12 Tabs. Indications: PAIN 30 Tab 0    omega-3 fatty acids cap Take 1,200 mg by mouth.  multivitamin (ONE A DAY) tablet Take 1 Tab by mouth daily.  famotidine (PEPCID) 10 mg tablet Take 10 mg by mouth three (3) times daily.  fluticasone (FLONASE) 50 mcg/actuation nasal spray Two sprays to each nostril twice a day for one week 1 Bottle 1     No current facility-administered medications on file prior to visit. Allergies   Allergen Reactions    Demerol [Meperidine] Other (comments)     Passed out and started fighting    Penicillins Hives     Past Medical History:   Diagnosis Date    Arthritis     both ankles    Diabetes ([address]     GERD (gastroesophageal reflux disease)     High cholesterol     Hypertension     Nausea & vomiting     Psychiatric disorder     anxiey and panic attack     Past Surgical History:   Procedure Laterality Date    HX BREAST BIOPSY Left 6/16/2016    LEFT BREAST NEEDLE LOCALIZED EXCISION OF ABNORMALITY performed by  ADOLFO COYLE [facility] at [facility] MAIN OR    HX CYST REMOVAL      HX ORTHOPAEDIC      right ankle surgery     Family History   Problem Relation Age of Onset    Heart Disease Father     Cancer Father 79     colon    Cancer Sister 37     oral    Heart Disease Paternal Grandmother      Social History     Social History    Marital status:      Spouse name: N/A    Number of children: N/A    Years of education: N/A     Occupational History    Not on file. Social History Main Topics    Smoking status: Never Smoker    Smokeless tobacco: Former User    Alcohol use Yes    Drug use: No    Sexual activity: Yes     Birth control/ protection: None     Other Topics Concern    Not on file     Social History Narrative         Review of Systems   Constitutional: Negative for chills, fever and malaise/fatigue. HENT: Negative for congestion, ear pain and sore throat.          Blood sugars have been high in the 180's   Eyes: Negative for blurred vision, double vision, pain, discharge and redness. Respiratory: Negative for cough, sputum production, shortness of breath and wheezing. Cardiovascular: Negative for chest pain, palpitations, claudication and leg swelling. Gastrointestinal: Positive for heartburn. Negative for abdominal pain, nausea and vomiting. Genitourinary: Negative for dysuria, hematuria and urgency. Musculoskeletal: Negative for joint pain. Neurological: Positive for headaches. Negative for dizziness, focal weakness and weakness. Psychiatric/Behavioral: Negative for depression. The patient is not nervous/anxious. Visit Vitals    /77 (BP 1 Location: Left arm, BP Patient Position: Sitting)    Pulse 75    Temp 96.8 °F (36 °C) (Oral)    Resp 16    Ht 5' 4\" (1.626 m)    Wt 212 lb (96.2 kg)    SpO2 100%    BMI 36.39 kg/m2       Physical Exam   Constitutional: She is oriented to person, place, and time. She appears well-developed and well-nourished. No distress. HENT:   Head: Normocephalic and atraumatic. Right Ear: External ear normal.   Left Ear: External ear normal.   Mouth/Throat: Oropharynx is clear and moist. No oropharyngeal exudate. Eyes: EOM are normal. Pupils are equal, round, and reactive to light. No scleral icterus. Neck: Normal range of motion. No thyromegaly present. Cardiovascular: Normal rate and normal heart sounds. Pulmonary/Chest: Effort normal and breath sounds normal. No respiratory distress. She has no wheezes. Right breast exhibits no inverted nipple, no mass, no nipple discharge, no skin change and no tenderness. Left breast exhibits no inverted nipple, no mass, no nipple discharge, no skin change and no tenderness. Breasts are symmetrical.   Abdominal: Soft. Bowel sounds are normal. She exhibits no distension. There is no tenderness. Lymphadenopathy:     She has no cervical adenopathy.    Neurological: She is alert and oriented to person, place, and time. Psychiatric: She has a normal mood and affect. ASSESSMENT and PLAN  Complete physical exam:  1) Please make sure you have a routine physical exam every 1-2 years. 2) Annual check up with eye doctor and dentist.  3) Annual mammograms for all females starting at age of 36.  3) Self breast exam every month starting at age of 21 and above. 5) Clinical breast exam to be done every 3 years for woman between 20-30 and every year for all woman 36 and above. 6) Pap smear every 3 years starting at age 24( between 24- 27 it may be more often). At the age of 27 to 72  can switch to every 5 years with HPV screening. Woman over the age of 72 with regular cervical cancer testing with normal results no longer need testing. 7) Colorectal cancer screening with colonoscopy every ten years. 8) Bone density testing starting at the age of 72.   5) Routine blood work to be ordered as part of physical exam and has been discussed with patient. 10) Screening for STD's/HIV. 11) Exercise at least 30 min 3-5 times a week for goal BMI of less than or equal to 25.  12) Please make sure you wear a seat belt while driving daily , helmet safety discussed. 13) Please avoid smoking , alcohol and illicit drug use. 14) Daily requirement of calcium is 1200 mg per day and 1000 IU of vitamin D.  15) Please make sure all immunizations are up to date:       - Influenza vaccine every year        - Tdap every 10 years       - Pneumococcal vaccine starting at age of 72       - Shingles at age 61     Diabetes :  1)   Goal HBA1C < 7.  2)   Post prandial blood sugar less than or equal to 180.  3)   Exercise 30 min 3-5 times a week for goal BMI of 25.  4)   Please monitor blood sugars as directed and keep a log to bring in with you at each visit. 5)   Diabetic diet discussed and patient instructions to be handed out. 6)   Goal LDL< 100  7)   Goal BP< 120/80 mmhg.   8)   Annual eye exam and foot exam.  9) Please examine you feet daily for any skin breakages or ulcers. 10) Referral made to see nutritionist for better dietary guidance. 11) Continue current medications as prescribed. 12) Explained the side effects of medication and patient verbalized understanding. 13) Please avoid smoking , alcohol and illicit drug use if applicable to you.  14) Please have blood work ordered today completed. 15) Please make sure you schedule your routine medical exam every 1-2 years. Hypertension :  1) Goal blood pressure less than equal to 140/90 mmHg, goal BP can vary depending on risk factors as discussed. 2) Lifestyle modifications discussed with patient, low sodium <2 gm, low salt , DASH diet  3) Exercise for at least 30 min 3-5 times a week for goal BMI of less than or equal  To 25.  4) Continue current medications as prescribed. 5) Please begin medication as discussed for better blood pressure control, explained side effects and patient verbalized understanding. 6) Goal LDL<100.  7) Please monitor your blood pressure and keep a log to bring in with you at each visit. 8) Discussed risk factors with patient such as CAD, FAST of stroke symptoms, pt verbalizes understanding. 9) Please avoid smoking , alcohol and any illicit drug use if applicable to you. Hyperlipidemia :  1) Goal LDL less than or equal to 100 mg/dl , total cholesterol less than or equal to 200 mg/dl. 2) Goal blood pressure less than or equal to 140/90 mmHg. 3) Discussed low - cholesterol and low fat diet, Good Fats vs Bad Fats. 4) Exercise 30 min 3-5 times a week for goal BMI of less than or equal to 25.  5) Continue current medication as prescribed for cholesterol control. 6) Explained the side effects of medication and patient verbalized understanding. 7) You may also begin fish oil 1000 mg 3-4 times a day for better cholesterol control. 8) You may also try red yeast rice for cholesterol control.   9) Please drink skim milk if you drink dairy products. 10) Please avoid smoking , alcohol , or any illicit drug use if applicable to you. 11) Regular cholesterol panel to be done every 6-12 months.  12) Discussed attributing risk factors , patient verbalized understanding. Medication refill:   Medications requested have been refilled.

## 2017-08-04 LAB
CREAT UR-MCNC: 24.34 MG/DL (ref 30–125)
MICROALBUMIN UR-MCNC: 0.6 MG/DL (ref 0–3)
MICROALBUMIN/CREAT UR-RTO: 25 MG/G (ref 0–30)

## 2017-08-06 LAB
A VAGINAE DNA VAG QL NAA+PROBE: ABNORMAL SCORE
BVAB2 DNA VAG QL NAA+PROBE: ABNORMAL SCORE
C ALBICANS DNA VAG QL NAA+PROBE: POSITIVE
C GLABRATA DNA VAG QL NAA+PROBE: NEGATIVE
C TRACH RRNA SPEC QL NAA+PROBE: NEGATIVE
MEGA1 DNA VAG QL NAA+PROBE: ABNORMAL SCORE
N GONORRHOEA RRNA SPEC QL NAA+PROBE: NEGATIVE
SPECIMEN SOURCE: ABNORMAL
T VAGINALIS RRNA SPEC QL NAA+PROBE: NEGATIVE

## 2017-08-07 ENCOUNTER — TELEPHONE (OUTPATIENT)
Dept: FAMILY MEDICINE CLINIC | Age: 52
End: 2017-08-07

## 2017-08-07 RX ORDER — METRONIDAZOLE 500 MG/1
500 TABLET ORAL 2 TIMES DAILY
Qty: 14 TAB | Refills: 0 | Status: SHIPPED | OUTPATIENT
Start: 2017-08-07 | End: 2017-08-14

## 2017-08-07 RX ORDER — FLUCONAZOLE 150 MG/1
150 TABLET ORAL DAILY
Qty: 1 TAB | Refills: 0 | Status: SHIPPED | OUTPATIENT
Start: 2017-08-07 | End: 2017-08-08

## 2017-08-07 RX ORDER — SIMVASTATIN 40 MG/1
40 TABLET, FILM COATED ORAL
Qty: 90 TAB | Refills: 0 | Status: SHIPPED | OUTPATIENT
Start: 2017-08-07 | End: 2017-12-11 | Stop reason: SDUPTHER

## 2017-08-07 NOTE — TELEPHONE ENCOUNTER
Called patient and spoke to her about nu swab results . Pap smear results with HPV are still pending and patient is aware. I will be sending antibiotic and antifungal pill to pharmacy and she would like to start a statin which I will also send.

## 2017-09-06 ENCOUNTER — OFFICE VISIT (OUTPATIENT)
Dept: FAMILY MEDICINE CLINIC | Age: 52
End: 2017-09-06

## 2017-09-06 VITALS
TEMPERATURE: 96.6 F | DIASTOLIC BLOOD PRESSURE: 80 MMHG | WEIGHT: 211 LBS | HEIGHT: 64 IN | OXYGEN SATURATION: 100 % | SYSTOLIC BLOOD PRESSURE: 135 MMHG | RESPIRATION RATE: 18 BRPM | HEART RATE: 69 BPM | BODY MASS INDEX: 36.02 KG/M2

## 2017-09-06 DIAGNOSIS — L03.211 FACIAL CELLULITIS: Primary | ICD-10-CM

## 2017-09-06 DIAGNOSIS — R73.9 ELEVATED BLOOD SUGAR: ICD-10-CM

## 2017-09-06 RX ORDER — SULFAMETHOXAZOLE AND TRIMETHOPRIM 800; 160 MG/1; MG/1
1 TABLET ORAL 2 TIMES DAILY
Qty: 20 TAB | Refills: 0 | Status: SHIPPED | OUTPATIENT
Start: 2017-09-06 | End: 2017-09-16

## 2017-09-06 RX ORDER — MUPIROCIN 20 MG/G
OINTMENT TOPICAL DAILY
Qty: 22 G | Refills: 0 | Status: SHIPPED | OUTPATIENT
Start: 2017-09-06

## 2017-09-06 NOTE — PROGRESS NOTES
Patient is her for left eye swelling, warm to touch and very itchy she states. ..1. Have you been to the ER, urgent care clinic since your last visit? Hospitalized since your last visit?no    2. Have you seen or consulted any other health care providers outside of the [address] [place] [address] since your last visit? Include any pap smears or colon screening.  no

## 2017-09-06 NOTE — PROGRESS NOTES
HISTORY OF PRESENT ILLNESS  Harper Bean is a 49 y.o. female. HPI Comments: Patient has developed cellulitis of the left orbital area again. She mentions she just woke up with this again and I have advised her to use a topical antibiotic and she verbalizes an understanding. Her blood sugars have also been high in the 200's and I have advised her this can occur due to uncontrolled blood sugar. She has been taking her diabetes medication and I have advised her to increase her dose of metformin to twice a day . Patient is aware and she mentions she has been running around lately and under stress and she will watch her diet as well. Eye Swelling    The history is provided by the patient. This is a new problem. The current episode started more than 2 days ago. The problem occurs constantly. The problem has been gradually worsening. The left eye is affected. The injury mechanism is unknown. The pain is at a severity of 2/10. The pain is mild. There is no history of trauma to the eye. There is no known exposure to pink eye. She does not wear contacts. Associated symptoms include foreign body sensation. Pertinent negatives include no numbness, no blurred vision, no decreased vision, no double vision, no eye redness, no nausea, no vomiting, no tingling, no weakness, no itching, no fever, no pain, no blindness, no head injury and no dizziness. She has tried nothing for the symptoms. Review of Systems   Constitutional: Negative for fever. HENT: Negative for ear pain, hearing loss, nosebleeds, sinus pain and sore throat. Eyes: Negative for blindness, blurred vision, double vision, pain and redness. Respiratory: Negative for cough, sputum production, shortness of breath and wheezing. Cardiovascular: Negative for chest pain, palpitations and leg swelling. Gastrointestinal: Negative for constipation, diarrhea, nausea and vomiting. Genitourinary: Negative for dysuria and hematuria.    Musculoskeletal: Negative for joint pain and myalgias. Skin: Negative for itching. Neurological: Negative for dizziness, tingling, tremors, focal weakness, weakness and numbness. Endo/Heme/Allergies: Negative for environmental allergies. Psychiatric/Behavioral: Negative for depression. The patient is not nervous/anxious and does not have insomnia. Physical Exam   Constitutional: She is oriented to person, place, and time. She appears well-developed and well-nourished. No distress. HENT:   Head: Normocephalic and atraumatic. Right Ear: External ear normal.   Left Ear: External ear normal.   Mouth/Throat: Oropharynx is clear and moist. No oropharyngeal exudate. Eyes: EOM are normal. Pupils are equal, round, and reactive to light. No scleral icterus. Neck: Normal range of motion. No thyromegaly present. Cardiovascular: Normal rate and normal heart sounds. Pulmonary/Chest: Breath sounds normal. No respiratory distress. She has no wheezes. Lymphadenopathy:     She has no cervical adenopathy. Neurological: She is alert and oriented to person, place, and time. Skin:        Left orbital swelling   Psychiatric: She has a normal mood and affect. ASSESSMENT and PLAN  Left orbital cellulitis :  - Please begin medication   - Better blood sugar control.

## 2017-09-12 ENCOUNTER — TELEPHONE (OUTPATIENT)
Dept: FAMILY MEDICINE CLINIC | Age: 52
End: 2017-09-12

## 2017-09-12 NOTE — TELEPHONE ENCOUNTER
Pt called complaining of swollen, itchy, and scaly eyes. Vision is blurry and pt is experiencing a lot of pain in eyes. Pt stated she has been taking the antibiotics prescribed to her the entire time since her last appointment on September 6, 2017. Please return patient's call with next steps.

## 2017-09-12 NOTE — TELEPHONE ENCOUNTER
Patient states she has been having blurry vision, headaches, flaky skin on side of face and pain to eye that was treat last Tuesday in office. Advise patient to take Benadryl, use a warm compress for 20 min, to take Tylenol/Ibuprofen and to avoid bright lights/watch tv for a long period of time to avoid strain on eye. message will be routed to provider. Patient verbalized understanding of conversation.

## 2017-09-22 NOTE — TELEPHONE ENCOUNTER
Requested Prescriptions     Pending Prescriptions Disp Refills    metFORMIN (GLUCOPHAGE) 500 mg tablet 90 Tab 3

## 2017-09-23 RX ORDER — METFORMIN HYDROCHLORIDE 500 MG/1
TABLET ORAL
Qty: 90 TAB | Refills: 3 | Status: SHIPPED | OUTPATIENT
Start: 2017-09-23 | End: 2017-09-26

## 2017-09-25 ENCOUNTER — TELEPHONE (OUTPATIENT)
Dept: FAMILY MEDICINE CLINIC | Age: 52
End: 2017-09-25

## 2017-09-25 NOTE — TELEPHONE ENCOUNTER
Pt states Dr Arvin Denver doubled dosage for rx metformin 500 mg.. States was told to take 2 a day to equal 1,000 mg once daily. Pt states please have Dr Arvin Denver change the directions on medication, States only has one day left.  States please call once Dr Arvin Denver has changed rx and zsent to the pharmacy

## 2017-09-26 RX ORDER — METFORMIN HYDROCHLORIDE 500 MG/1
500 TABLET ORAL 2 TIMES DAILY WITH MEALS
Qty: 90 TAB | Refills: 3 | Status: SHIPPED | OUTPATIENT
Start: 2017-09-26 | End: 2017-10-04 | Stop reason: SDUPTHER

## 2017-10-04 DIAGNOSIS — E11.9 TYPE 2 DIABETES MELLITUS WITHOUT COMPLICATION, WITHOUT LONG-TERM CURRENT USE OF INSULIN (HCC): Primary | ICD-10-CM

## 2017-10-04 RX ORDER — METFORMIN HYDROCHLORIDE 500 MG/1
500 TABLET ORAL 2 TIMES DAILY WITH MEALS
Qty: 90 TAB | Refills: 3 | Status: SHIPPED | OUTPATIENT
Start: 2017-10-04 | End: 2018-11-28 | Stop reason: SDUPTHER

## 2017-12-11 RX ORDER — SIMVASTATIN 40 MG/1
TABLET, FILM COATED ORAL
Qty: 90 TAB | Refills: 0 | Status: SHIPPED | OUTPATIENT
Start: 2017-12-11 | End: 2018-03-20 | Stop reason: SDUPTHER

## 2018-03-20 RX ORDER — SIMVASTATIN 40 MG/1
TABLET, FILM COATED ORAL
Qty: 90 TAB | Refills: 0 | Status: SHIPPED | OUTPATIENT
Start: 2018-03-20 | End: 2018-09-04 | Stop reason: SDUPTHER

## 2018-04-09 ENCOUNTER — OFFICE VISIT (OUTPATIENT)
Dept: FAMILY MEDICINE CLINIC | Age: 53
End: 2018-04-09

## 2018-04-09 VITALS
DIASTOLIC BLOOD PRESSURE: 65 MMHG | TEMPERATURE: 97.5 F | WEIGHT: 214 LBS | HEIGHT: 64 IN | HEART RATE: 88 BPM | BODY MASS INDEX: 36.54 KG/M2 | OXYGEN SATURATION: 98 % | SYSTOLIC BLOOD PRESSURE: 124 MMHG | RESPIRATION RATE: 18 BRPM

## 2018-04-09 DIAGNOSIS — J01.00 ACUTE NON-RECURRENT MAXILLARY SINUSITIS: ICD-10-CM

## 2018-04-09 DIAGNOSIS — J02.9 SORE THROAT: Primary | ICD-10-CM

## 2018-04-09 RX ORDER — FLUTICASONE PROPIONATE 50 MCG
SPRAY, SUSPENSION (ML) NASAL
Qty: 1 BOTTLE | Refills: 1 | Status: SHIPPED | OUTPATIENT
Start: 2018-04-09

## 2018-04-09 RX ORDER — AZITHROMYCIN 250 MG/1
TABLET, FILM COATED ORAL
Qty: 6 TAB | Refills: 0 | Status: SHIPPED | OUTPATIENT
Start: 2018-04-09 | End: 2018-04-14

## 2018-04-09 NOTE — MR AVS SNAPSHOT
[address] [address] 
[phone] Patient: Jennifer Vanegas MRN: [id number] :1965 Visit Information Date & Time Provider Department Dept. Phone Encounter #  
 2018  4:15 PM Yaa Petty NP [address] [phone] [id number] Follow-up Instructions Return if symptoms worsen or fail to improve. Upcoming Health Maintenance Date Due  
 FOOT EXAM Q1 1976 DTaP/Tdap/Td series (1 - Tdap) 1987 FOBT Q 1 YEAR AGE 50-75 2016 Influenza Age 5 to Adult 2017 HEMOGLOBIN A1C Q6M 2/3/2018 BREAST CANCER SCRN MAMMOGRAM 2018 MICROALBUMIN Q1 8/3/2018 LIPID PANEL Q1 8/3/2018 EYE EXAM RETINAL OR DILATED Q1 2018 PAP AKA CERVICAL CYTOLOGY 8/3/2020 Allergies as of 2018  Review Complete On: 2018 By: George Dorman LPN Severity Noted Reaction Type Reactions Demerol [Meperidine] High 2015    Other (comments) Passed out and started fighting Penicillins High 2015    Hives Current Immunizations  Reviewed on 2015 Name Date Influenza Vaccine 2015 Pneumococcal Polysaccharide (PPSV-23) 2015 Not reviewed this visit You Were Diagnosed With   
  
 Codes Comments Sore throat    -  Primary ICD-10-CM: J02.9 ICD-9-CM: 849 Acute non-recurrent maxillary sinusitis     ICD-10-CM: J01.00 ICD-9-CM: 461.0 Vitals BP Pulse Temp Resp Height(growth percentile) Weight(growth percentile) 124/65 88 97.5 °F (36.4 °C) (Oral) 18 5' 4\" (1.626 m) 214 lb (97.1 kg) SpO2 BMI OB Status Smoking Status 98% 36.73 kg/m2 Premenopausal Never Smoker Vitals History BMI and BSA Data Body Mass Index Body Surface Area  
 36.73 kg/m 2 2.09 m 2 Preferred Pharmacy Pharmacy Name Phone [facility]  [facility], [address] [place] [address] [phone] Your Updated Medication List  
  
   
This list is accurate as of 4/9/18  4:35 PM.  Always use your most recent med list.  
  
  
  
  
 azithromycin 250 mg tablet Commonly known as:  Halina Felisa Take 2 tablets today, then take 1 tablet daily  
  
 famotidine 10 mg tablet Commonly known as:  PEPCID Take 10 mg by mouth three (3) times daily. fluticasone 50 mcg/actuation nasal spray Commonly known as:  Leafy Few Two sprays to each nostril twice a day for one week HYDROcodone-acetaminophen 5-325 mg per tablet Commonly known as:  Moraima Iron Take 2 Tabs by mouth every four (4) hours as needed for Pain. Max Daily Amount: 12 Tabs. Indications: PAIN  
  
 lisinopril-hydroCHLOROthiazide 20-12.5 mg per tablet Commonly known as:  PRINZIDE, ZESTORETIC  
take 1 tablet by mouth daily  
  
 metFORMIN 500 mg tablet Commonly known as:  GLUCOPHAGE Take 1 Tab by mouth two (2) times daily (with meals). multivitamin tablet Commonly known as:  ONE A DAY Take 1 Tab by mouth daily. mupirocin 2 % ointment Commonly known as:  [facility] Apply  to affected area daily. omega-3 fatty acids Cap Take 1,200 mg by mouth. simvastatin 40 mg tablet Commonly known as:  ZOCOR  
TAKE 1 TABLET BY MOUTH NIGHTLY Prescriptions Sent to Pharmacy Refills  
 fluticasone (FLONASE) 50 mcg/actuation nasal spray 1 Sig: Two sprays to each nostril twice a day for one week Class: Normal  
 Pharmacy: [place] [address] [address] [facility] Ph #: [phone]  
 azithromycin (ZITHROMAX) 250 mg tablet 0 Sig: Take 2 tablets today, then take 1 tablet daily Class: Normal  
 Pharmacy: [facility] [place] [address], [address] [facility] Ph #: [phone] We Performed the Following AMB POC RAPID STREP A [32027 CPT(R)] Follow-up Instructions Return if symptoms worsen or fail to improve. Patient Instructions Sinusitis: Care Instructions Your Care Instructions Sinusitis is an infection of the lining of the sinus cavities in your head. Sinusitis often follows a cold. It causes pain and pressure in your head and face. In most cases, sinusitis gets better on its own in 1 to 2 weeks. But some mild symptoms may last for several weeks. Sometimes antibiotics are needed. Follow-up care is a key part of your treatment and safety. Be sure to make and go to all appointments, and call your doctor if you are having problems. It's also a good idea to know your test results and keep a list of the medicines you take. How can you care for yourself at home? · Take an over-the-counter pain medicine, such as acetaminophen (Tylenol), ibuprofen (Advil, Motrin), or naproxen (Aleve). Read and follow all instructions on the label. · If the doctor prescribed antibiotics, take them as directed. Do not stop taking them just because you feel better. You need to take the full course of antibiotics. · Be careful when taking over-the-counter cold or flu medicines and Tylenol at the same time. Many of these medicines have acetaminophen, which is Tylenol. Read the labels to make sure that you are not taking more than the recommended dose. Too much acetaminophen (Tylenol) can be harmful. · Breathe warm, moist air from a steamy shower, a hot bath, or a sink filled with hot water. Avoid cold, dry air. Using a humidifier in your home may help. Follow the directions for cleaning the machine. · Use saline (saltwater) nasal washes to help keep your nasal passages open and wash out mucus and bacteria. You can buy saline nose drops at a grocery store or drugstore. Or you can make your own at home by adding 1 teaspoon of salt and 1 teaspoon of baking soda to 2 cups of distilled water.  If you make your own, fill a bulb syringe with the solution, insert the tip into your nostril, and squeeze gently. Yvonna Sins your nose. · Put a hot, wet towel or a warm gel pack on your face 3 or 4 times a day for 5 to 10 minutes each time. · Try a decongestant nasal spray like oxymetazoline (Afrin). Do not use it for more than 3 days in a row. Using it for more than 3 days can make your congestion worse. When should you call for help? Call your doctor now or seek immediate medical care if: 
? · You have new or worse swelling or redness in your face or around your eyes. ? · You have a new or higher fever. ? Watch closely for changes in your health, and be sure to contact your doctor if: 
? · You have new or worse facial pain. ? · The mucus from your nose becomes thicker (like pus) or has new blood in it. ? · You are not getting better as expected. Where can you learn more? Go to http://gaston-ivone.info/. Enter H884 in the search box to learn more about \"Sinusitis: Care Instructions. \" Current as of: May 12, 2017 Content Version: 11.4 © 7336-7200 [employer]. Care instructions adapted under license by [employer] (which disclaims liability or warranty for this information). If you have questions about a medical condition or this instruction, always ask your healthcare professional. [address] any warranty or liability for your use of this information. Introducing [facility]! Dear Jens Prieto: Thank you for requesting a [email or web address] account. Our records indicate that you already have an active [email or web address] account. You can access your account anytime at [email or web address] Did you know that you can access your hospital and ER discharge instructions at any time in [email or web address]? You can also review all of your test results from your hospital stay or ER visit. Additional Information If you have questions, please visit the Frequently Asked Questions section of the [employer] website at [email or web address] Remember, [employer] is NOT to be used for urgent needs. For medical emergencies, dial 911. Now available from your iPhone and Android! Please provide this summary of care documentation to your next provider. Your primary care clinician is listed as Sandee Stewart. If you have any questions after today's visit, please call [phone].

## 2018-04-09 NOTE — PROGRESS NOTES
Chief Complaint   Patient presents with    Cough     Patient started with nasal congestion and a cough started a week. Patient stated she started losing her voice. 1. Have you been to the ER, urgent care clinic since your last visit? Hospitalized since your last visit? No    2. Have you seen or consulted any other health care providers outside of the [address] [place] [address] since your last visit? Include any pap smears or colon screening.  No

## 2018-04-09 NOTE — PATIENT INSTRUCTIONS
Sinusitis: Care Instructions  Your Care Instructions    Sinusitis is an infection of the lining of the sinus cavities in your head. Sinusitis often follows a cold. It causes pain and pressure in your head and face. In most cases, sinusitis gets better on its own in 1 to 2 weeks. But some mild symptoms may last for several weeks. Sometimes antibiotics are needed. Follow-up care is a key part of your treatment and safety. Be sure to make and go to all appointments, and call your doctor if you are having problems. It's also a good idea to know your test results and keep a list of the medicines you take. How can you care for yourself at home? · Take an over-the-counter pain medicine, such as acetaminophen (Tylenol), ibuprofen (Advil, Motrin), or naproxen (Aleve). Read and follow all instructions on the label. · If the doctor prescribed antibiotics, take them as directed. Do not stop taking them just because you feel better. You need to take the full course of antibiotics. · Be careful when taking over-the-counter cold or flu medicines and Tylenol at the same time. Many of these medicines have acetaminophen, which is Tylenol. Read the labels to make sure that you are not taking more than the recommended dose. Too much acetaminophen (Tylenol) can be harmful. · Breathe warm, moist air from a steamy shower, a hot bath, or a sink filled with hot water. Avoid cold, dry air. Using a humidifier in your home may help. Follow the directions for cleaning the machine. · Use saline (saltwater) nasal washes to help keep your nasal passages open and wash out mucus and bacteria. You can buy saline nose drops at a grocery store or drugstore. Or you can make your own at home by adding 1 teaspoon of salt and 1 teaspoon of baking soda to 2 cups of distilled water. If you make your own, fill a bulb syringe with the solution, insert the tip into your nostril, and squeeze gently. Francheska Cramp your nose.   · Put a hot, wet towel or a warm gel pack on your face 3 or 4 times a day for 5 to 10 minutes each time. · Try a decongestant nasal spray like oxymetazoline (Afrin). Do not use it for more than 3 days in a row. Using it for more than 3 days can make your congestion worse. When should you call for help? Call your doctor now or seek immediate medical care if:  ? · You have new or worse swelling or redness in your face or around your eyes. ? · You have a new or higher fever. ? Watch closely for changes in your health, and be sure to contact your doctor if:  ? · You have new or worse facial pain. ? · The mucus from your nose becomes thicker (like pus) or has new blood in it. ? · You are not getting better as expected. Where can you learn more? Go to http://gaston-ivone.info/. Enter N560 in the search box to learn more about \"Sinusitis: Care Instructions. \"  Current as of: May 12, 2017  Content Version: 11.4  © 4522-8807 [employer]. Care instructions adapted under license by [employer] (which disclaims liability or warranty for this information). If you have questions about a medical condition or this instruction, always ask your healthcare professional. [address] any warranty or liability for your use of this information.

## 2018-04-10 NOTE — PROGRESS NOTES
Chief Complaint   Patient presents with    Cough     she is a 47y.o. year old female who presents for evaluation of sinus congestion, cough and hoarseness 7 days. Onset with sinus congestion, cough throughout night and sore throat pain in am.  Denies fever, sob, cp, lightheadedness, dizziness, vomting, d/c.    Reviewed and agree with Nurse Note duplicated in this note. Reviewed PmHx, RxHx, FmHx, SocHx, AllgHx and updated in chart. Patient Labs were reviewed: yes    Patient Past Records were reviewed:  yes    Review of Systems - negative except as listed above    Objective:     Vitals:    04/09/18 1611   BP: 124/65   Pulse: 88   Resp: 18   Temp: 97.5 °F (36.4 °C)   TempSrc: Oral   SpO2: 98%   Weight: 214 lb (97.1 kg)   Height: 5' 4\" (1.626 m)     Physical Examination: General appearance - alert, well appearing, and in no distress  Mental status - alert, oriented to person, place, and time  Eyes - pupils equal and reactive, extraocular eye movements intact  Ears - bilateral TM's and external ear canals normal  Nose - mucosal congestion, mucosal erythema, purulent rhinorrhea and sinus tenderness noted maxillary  Mouth - mucous membranes moist, pharynx normal without lesions and erythematous  Neck - supple, no significant adenopathy  Lymphatics - no palpable lymphadenopathy, no hepatosplenomegaly  Chest - clear to auscultation, no wheezes, rales or rhonchi, symmetric air entry  Heart - normal rate, regular rhythm, normal S1, S2, no murmurs, rubs, clicks or gallops  Extremities - peripheral pulses normal, no pedal edema, no clubbing or cyanosis    Assessment/ Plan:   Diagnoses and all orders for this visit:    1. Sore throat  -     AMB POC RAPID STREP A    2. Acute non-recurrent maxillary sinusitis  -     fluticasone (FLONASE) 50 mcg/actuation nasal spray; Two sprays to each nostril twice a day for one week  -     azithromycin (ZITHROMAX) 250 mg tablet;  Take 2 tablets today, then take 1 tablet daily       Encounter Diagnoses   Name Primary?  Sore throat Yes    Acute non-recurrent maxillary sinusitis      Orders Placed This Encounter    AMB POC RAPID STREP A    fluticasone (FLONASE) 50 mcg/actuation nasal spray    azithromycin (ZITHROMAX) 250 mg tablet     Follow-up Disposition:  Return if symptoms worsen or fail to improve. Patient Instructions          Sinusitis: Care Instructions  Your Care Instructions    Sinusitis is an infection of the lining of the sinus cavities in your head. Sinusitis often follows a cold. It causes pain and pressure in your head and face. In most cases, sinusitis gets better on its own in 1 to 2 weeks. But some mild symptoms may last for several weeks. Sometimes antibiotics are needed. Follow-up care is a key part of your treatment and safety. Be sure to make and go to all appointments, and call your doctor if you are having problems. It's also a good idea to know your test results and keep a list of the medicines you take. How can you care for yourself at home? · Take an over-the-counter pain medicine, such as acetaminophen (Tylenol), ibuprofen (Advil, Motrin), or naproxen (Aleve). Read and follow all instructions on the label. · If the doctor prescribed antibiotics, take them as directed. Do not stop taking them just because you feel better. You need to take the full course of antibiotics. · Be careful when taking over-the-counter cold or flu medicines and Tylenol at the same time. Many of these medicines have acetaminophen, which is Tylenol. Read the labels to make sure that you are not taking more than the recommended dose. Too much acetaminophen (Tylenol) can be harmful. · Breathe warm, moist air from a steamy shower, a hot bath, or a sink filled with hot water. Avoid cold, dry air. Using a humidifier in your home may help. Follow the directions for cleaning the machine. · Use saline (saltwater) nasal washes to help keep your nasal passages open and wash out mucus and bacteria. You can buy saline nose drops at a grocery store or drugstore. Or you can make your own at home by adding 1 teaspoon of salt and 1 teaspoon of baking soda to 2 cups of distilled water. If you make your own, fill a bulb syringe with the solution, insert the tip into your nostril, and squeeze gently. Kristen Risen your nose. · Put a hot, wet towel or a warm gel pack on your face 3 or 4 times a day for 5 to 10 minutes each time. · Try a decongestant nasal spray like oxymetazoline (Afrin). Do not use it for more than 3 days in a row. Using it for more than 3 days can make your congestion worse. When should you call for help? Call your doctor now or seek immediate medical care if:  ? · You have new or worse swelling or redness in your face or around your eyes. ? · You have a new or higher fever. ? Watch closely for changes in your health, and be sure to contact your doctor if:  ? · You have new or worse facial pain. ? · The mucus from your nose becomes thicker (like pus) or has new blood in it. ? · You are not getting better as expected. Where can you learn more? Go to http://gaston-ivone.info/. Enter T018 in the search box to learn more about \"Sinusitis: Care Instructions. \"  Current as of: May 12, 2017  Content Version: 11.4  © 3788-9024 [employer]. Care instructions adapted under license by [employer] (which disclaims liability or warranty for this information). If you have questions about a medical condition or this instruction, always ask your healthcare professional. [place] [address] any warranty or liability for your use of this information. I have discussed the diagnosis with the patient and the intended plan as seen in the above orders. The patient has received an After-Visit Summary and questions were answered concerning future plans.      Medication Side Effects and Warnings were discussed with patient: yes      Raulito Tobar NP-C  [employer]

## 2018-04-25 ENCOUNTER — TELEPHONE (OUTPATIENT)
Dept: FAMILY MEDICINE CLINIC | Age: 53
End: 2018-04-25

## 2018-04-25 NOTE — TELEPHONE ENCOUNTER
Pt states was told by Dr Newton Chen has to stop by office to have labs done do to her  has an infection and pt need to be tested as well. Pt states will come by the office tomorrow to have labs done.

## 2018-04-26 ENCOUNTER — TELEPHONE (OUTPATIENT)
Dept: FAMILY MEDICINE CLINIC | Age: 53
End: 2018-04-26

## 2018-04-26 DIAGNOSIS — Z20.2 EXPOSURE TO SYPHILIS: Primary | ICD-10-CM

## 2018-04-26 DIAGNOSIS — Z11.3 SCREENING FOR STD (SEXUALLY TRANSMITTED DISEASE): ICD-10-CM

## 2018-05-02 ENCOUNTER — LAB ONLY (OUTPATIENT)
Dept: FAMILY MEDICINE CLINIC | Age: 53
End: 2018-05-02

## 2018-05-02 ENCOUNTER — HOSPITAL ENCOUNTER (OUTPATIENT)
Dept: LAB | Age: 53
Discharge: HOME | End: 2018-05-02
Payer: COMMERCIAL

## 2018-05-02 DIAGNOSIS — Z01.89 ROUTINE LAB DRAW: Primary | ICD-10-CM

## 2018-05-02 DIAGNOSIS — Z11.3 SCREENING FOR STD (SEXUALLY TRANSMITTED DISEASE): ICD-10-CM

## 2018-05-02 DIAGNOSIS — Z20.2 EXPOSURE TO SYPHILIS: ICD-10-CM

## 2018-05-02 LAB — RPR SER QL: NONREACTIVE

## 2018-05-02 PROCEDURE — 86702 HIV-2 ANTIBODY: CPT | Performed by: FAMILY MEDICINE

## 2018-05-02 PROCEDURE — 86694 HERPES SIMPLEX NES ANTBDY: CPT | Performed by: FAMILY MEDICINE

## 2018-05-02 PROCEDURE — 86592 SYPHILIS TEST NON-TREP QUAL: CPT | Performed by: FAMILY MEDICINE

## 2018-05-03 LAB
HIV 2 ANTIBODY,HIV2: NEGATIVE
HSV1+2 IGM SER IA-ACNC: <0.91 RATIO (ref 0–0.9)

## 2018-09-04 RX ORDER — SIMVASTATIN 40 MG/1
TABLET, FILM COATED ORAL
Qty: 90 TAB | Refills: 0 | Status: SHIPPED | OUTPATIENT
Start: 2018-09-04 | End: 2018-11-28 | Stop reason: SDUPTHER

## 2018-09-04 RX ORDER — LISINOPRIL AND HYDROCHLOROTHIAZIDE 12.5; 2 MG/1; MG/1
TABLET ORAL
Qty: 90 TAB | Refills: 3 | Status: SHIPPED | OUTPATIENT
Start: 2018-09-04 | End: 2018-11-28 | Stop reason: SDUPTHER

## 2018-11-28 DIAGNOSIS — E11.9 TYPE 2 DIABETES MELLITUS WITHOUT COMPLICATION, WITHOUT LONG-TERM CURRENT USE OF INSULIN (HCC): ICD-10-CM

## 2018-11-28 RX ORDER — LISINOPRIL AND HYDROCHLOROTHIAZIDE 12.5; 2 MG/1; MG/1
TABLET ORAL
Qty: 90 TAB | Refills: 3 | Status: SHIPPED | OUTPATIENT
Start: 2018-11-28

## 2018-11-28 RX ORDER — SIMVASTATIN 40 MG/1
TABLET, FILM COATED ORAL
Qty: 90 TAB | Refills: 0 | Status: SHIPPED | OUTPATIENT
Start: 2018-11-28 | End: 2019-03-06 | Stop reason: SDUPTHER

## 2018-11-28 RX ORDER — METFORMIN HYDROCHLORIDE 500 MG/1
500 TABLET ORAL 2 TIMES DAILY WITH MEALS
Qty: 180 TAB | Refills: 3 | Status: SHIPPED | OUTPATIENT
Start: 2018-11-28

## 2018-11-28 NOTE — TELEPHONE ENCOUNTER
Per fax from Pharmacy requesting a 90 day supply for all medication. Requested Prescriptions     Pending Prescriptions Disp Refills    simvastatin (ZOCOR) 40 mg tablet 90 Tab 0    metFORMIN (GLUCOPHAGE) 500 mg tablet 90 Tab 3     Sig: Take 1 Tab by mouth two (2) times daily (with meals).     lisinopril-hydroCHLOROthiazide (PRINZIDE, ZESTORETIC) 20-12.5 mg per tablet 90 Tab 3

## 2019-03-06 RX ORDER — SIMVASTATIN 40 MG/1
TABLET, FILM COATED ORAL
Qty: 90 TAB | Refills: 0 | Status: SHIPPED | OUTPATIENT
Start: 2019-03-06 | End: 2019-07-15 | Stop reason: SDUPTHER

## 2019-07-15 RX ORDER — SIMVASTATIN 40 MG/1
TABLET, FILM COATED ORAL
Qty: 90 TAB | Refills: 0 | Status: SHIPPED | OUTPATIENT
Start: 2019-07-15 | End: 2019-10-21 | Stop reason: SDUPTHER

## 2019-10-21 RX ORDER — SIMVASTATIN 40 MG/1
TABLET, FILM COATED ORAL
Qty: 90 TAB | Refills: 4 | Status: SHIPPED | OUTPATIENT
Start: 2019-10-21

## 2023-01-31 RX ORDER — FAMOTIDINE 10 MG
10 TABLET ORAL 3 TIMES DAILY
COMMUNITY

## 2023-01-31 RX ORDER — SIMVASTATIN 40 MG
TABLET ORAL
COMMUNITY
Start: 2019-10-21

## 2023-01-31 RX ORDER — HYDROCODONE BITARTRATE AND ACETAMINOPHEN 5; 325 MG/1; MG/1
2 TABLET ORAL EVERY 4 HOURS PRN
COMMUNITY
Start: 2016-06-16

## 2023-01-31 RX ORDER — FLUTICASONE PROPIONATE 50 MCG
SPRAY, SUSPENSION (ML) NASAL
COMMUNITY
Start: 2018-04-09

## 2023-01-31 RX ORDER — LISINOPRIL AND HYDROCHLOROTHIAZIDE 20; 12.5 MG/1; MG/1
TABLET ORAL
COMMUNITY
Start: 2018-11-28